# Patient Record
Sex: FEMALE | Race: WHITE | NOT HISPANIC OR LATINO | RURAL
[De-identification: names, ages, dates, MRNs, and addresses within clinical notes are randomized per-mention and may not be internally consistent; named-entity substitution may affect disease eponyms.]

---

## 2019-02-04 NOTE — PATIENT DISCUSSION
CATARACTS OU - NOT VISUALLY SIGNIFICANT AT THIS TIME. WILL RE-EVALUATE ONCE THE RIGHT EYE IS STABLE IF SYMPTOMS PERSIST.

## 2019-02-04 NOTE — PATIENT DISCUSSION
MACULAR HEMORRHAGE OD - MOST LIKELY CAUSE OF DECREASED VISION. REFER TO RETINA SPECIALIST FOR EVALUATION AND TREATMENT.

## 2019-02-11 NOTE — PATIENT DISCUSSION
SUSPECT HTN RETINOPATHY: EMPHASIZED IMPORTANCE OF GOOD BLOOD PRESSURE CONTROL TO MINIMIZE RISK OF VASCULAR COMPLICATIONS IN THE EYE. SCHEDULED FOR BP, DM, LIPID SCREENING WITH DR GUADARRAMA.

## 2019-09-11 NOTE — PATIENT DISCUSSION
OHTN OD: IOP ELEVATED TODAY. DEFER PGA DROPS DUE TO MACULAR EDEMA. PRESCRIBED TIMOLOL BID OD. RETURN AS SCHEDULED.

## 2019-09-11 NOTE — PATIENT DISCUSSION
New Prescription: timolol maleate (timolol maleate): drops: 0.5% 1 drop twice a day as directed into right eye 09-

## 2019-11-20 NOTE — PATIENT DISCUSSION
OHTN, OD:  ELEVATED INTRAOCULAR PRESSURE WITHOUT OPTIC NERVE CHANGES. CONTINUE TIMOLOL 2X/DAY OD. RETURN FOR FOLLOW-UP AS SCHEDULED.

## 2019-11-20 NOTE — PATIENT DISCUSSION
Continue: timolol maleate (timolol maleate): drops: 0.5% 1 drop twice a day as directed into right eye 09-

## 2020-11-04 NOTE — PATIENT DISCUSSION
RETINA IS ATTACHED OU: PVD OD;  NO HOLES OR TEARS SEEN ON DILATED EXAM TODAY.  RETINAL DETACHMENT SIGNS AND SYMPTOMS REVIEWED oligohydramnios

## 2021-01-27 NOTE — PATIENT DISCUSSION
CLEARED FOR PHACO OU. RETURN TO DR. FERRO FOR CAT EVAL. 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

## 2021-01-27 NOTE — PATIENT DISCUSSION
New Prescription: timolol maleate (timolol maleate): drops: 0.5% 1 drop twice a day into right eye 01-

## 2021-03-01 NOTE — PATIENT DISCUSSION
Patient seen in [facility] for labs and teaching with Rick Montes, Transplant Coordinator. Refractive error / Presbyopic Correction Counseling:

## 2021-03-01 NOTE — PATIENT DISCUSSION
Primary Open Angle Glaucoma (POAG) Counseling: Primary Open Angle Glaucoma is a progressive condition and is the most common cause of irreversible blindness worldwide. POAG is a subset of the glaucomas defined by an open, normal appearing anterior chamber angle and raised intraocular pressure. I have explained to the patient that successful management is dependent on patient compliance with treatment as prescribed and/or regular follow-up to monitor for possible progression.

## 2021-03-01 NOTE — PATIENT DISCUSSION
Branch Retinal Vein Occlusion (BRVO) Counseling:  Branch retinal vein occlusion is a blockage of the small veins in the retina. I have explained the need for evaluation of underlying medical disorders in conjunction with the patient's primary care doctor. Serious complications can occur including the formation of new abnormal blood vessels, retinal swelling, and elevated eye pressure. Consultation with a specialist is recommended.

## 2021-03-01 NOTE — PATIENT DISCUSSION
New Prescription: prednisol ace-gatiflox-bromfen (prednisol ace-gatiflox-bromfen): drops,suspension: 1-0.5-0.075% 1 drop three times a day [facility] 03-

## 2021-03-01 NOTE — PATIENT DISCUSSION
REFRACTIVE ERROR, OU - DISCUSSED OPTION OF CORRECTING AT THE TIME OF CATARACT SURGERY. PT UNDERSTANDS AND ELECTS TO PROCEED WITH TRADTIONAL CATARACT SURGERY.

## 2021-03-01 NOTE — PATIENT DISCUSSION
CATARACT, OU - VISUALLY SIGNIFICANT OD. SCHEDULE CATARACT SURGERY OD. CLEARED BY DR. Priyanka Phillips TO PROCEED.

## 2021-03-11 NOTE — PATIENT DISCUSSION
Continue: prednisol ace-gatiflox-bromfen (prednisol ace-gatiflox-bromfen): drops,suspension: 1-0.5-0.075% 1 drop three times a day as directed [facility] 03-

## 2021-03-11 NOTE — PATIENT DISCUSSION
***This patient had traditional cataract surgery performed. A monofocal IOL was placed to achieve a target refraction of plano (which should provide them with satisfactory vision with the aid of glasses/contact lenses). ***

## 2021-04-21 NOTE — PATIENT DISCUSSION
POAG, OD: INTRAOCULAR PRESSURE IS WITHIN ACCEPTABLE LIMITS. PT INSTRUCTED TO CONTINUE TIMOLOL 2X/DAY OD. RETURN FOR FOLLOW-UP AS SCHEDULED.

## 2022-02-03 ENCOUNTER — EMERGENCY VISIT (OUTPATIENT)
Dept: RURAL CLINIC 3 | Facility: CLINIC | Age: 67
End: 2022-02-03

## 2022-02-03 DIAGNOSIS — H00.011: ICD-10-CM

## 2022-02-03 PROCEDURE — 99204 OFFICE O/P NEW MOD 45 MIN: CPT

## 2022-02-03 ASSESSMENT — VISUAL ACUITY
OS_SC: 20/30
OD_SC: 20/20

## 2022-02-03 NOTE — PATIENT DISCUSSION
Discussed dx w/ patient Patient needs to start oral antibiotics Recommend hot compressesStart Doxycycline 100 mg BID x 10 days , RX sent to pharmacy. Patient to let us know if not improved & consider surgical intervention in 2 weeks @ follow up if still present.

## 2022-02-21 ENCOUNTER — FOLLOW UP (OUTPATIENT)
Dept: RURAL CLINIC 3 | Facility: CLINIC | Age: 67
End: 2022-02-21

## 2022-02-21 DIAGNOSIS — H00.011: ICD-10-CM

## 2022-02-21 PROCEDURE — 99214 OFFICE O/P EST MOD 30 MIN: CPT

## 2022-02-21 ASSESSMENT — VISUAL ACUITY
OS_SC: 20/20-1
OD_SC: 20/20

## 2022-02-21 NOTE — PATIENT DISCUSSION
Discussed with patient. Improved some since being on oral antibiotics but still present. Recommend surgical excision and discussed RBA's with patient. Pt agrees with plan. Will proceed with excision today.

## 2022-02-22 ENCOUNTER — CLINIC PROCEDURE ONLY (OUTPATIENT)
Dept: RURAL CLINIC 3 | Facility: CLINIC | Age: 67
End: 2022-02-22

## 2022-02-22 NOTE — PROCEDURE NOTE: CLINICAL
PROCEDURE NOTE: Excision Chalazion, Single #1 Right Upper Lid. Diagnosis: External Hordeolum. Anesthesia: Subconjunctival Lidocaine. Prep: Betadine Scrub. Prior to treatment, the risks/benefits/alternatives were discussed. The patient wished to proceed with procedure. 2% Lidocaine was injected into the *Right upper eyelid and the lid was prepped with an alcohol swab. Betadine was used to clean the area around the eye. The lid was clamped using a chalazion clamp exposing the posterior surface on the eyelid. The chalazion was incised with a #11 blade and the contents were removed with a curette. Shonda and forceps were then used to excise the capsule. The clamp was removed and erythromycin ointment was instilled. A pressure patch was placed over the eye. The patient tolerated the procedure well and there were no complications. Post procedure instructions given. Indio Machado

## 2022-03-03 ENCOUNTER — POST-OP (OUTPATIENT)
Dept: RURAL CLINIC 3 | Facility: CLINIC | Age: 67
End: 2022-03-03

## 2022-03-03 DIAGNOSIS — H00.011: ICD-10-CM

## 2022-03-03 PROCEDURE — 99024 POSTOP FOLLOW-UP VISIT: CPT

## 2022-03-03 ASSESSMENT — VISUAL ACUITY
OD_SC: 20/20
OS_SC: 20/30

## 2022-03-03 NOTE — PATIENT DISCUSSION
Discussed with patient. New hordeolum present today. Recommend start OcuSoft lid scrubs, sample given today and start Augmentin 875 mg BID X 10 days, Rx sent to pharmacy. Patient to call if no improvement. Continue to monitor.

## 2022-09-12 NOTE — PATIENT DISCUSSION
***Refer to Patient Visual Assessment Questionnaire (scanned into document center). *** Opzelura Pregnancy And Lactation Text: There is insufficient data to evaluate drug-associated risk for major birth defects, miscarriage, or other adverse maternal or fetal outcomes.  There is a pregnancy registry that monitors pregnancy outcomes in pregnant persons exposed to the medication during pregnancy.  It is unknown if this medication is excreted in breast milk.  Do not breastfeed during treatment and for about 4 weeks after the last dose.